# Patient Record
Sex: MALE | Race: WHITE | URBAN - METROPOLITAN AREA
[De-identification: names, ages, dates, MRNs, and addresses within clinical notes are randomized per-mention and may not be internally consistent; named-entity substitution may affect disease eponyms.]

---

## 2023-09-13 ENCOUNTER — PROCEDURE VISIT (OUTPATIENT)
Dept: AUDIOLOGY | Age: 38
End: 2023-09-13

## 2023-09-13 DIAGNOSIS — H90.3 SENSORINEURAL HEARING LOSS, BILATERAL: ICD-10-CM

## 2023-09-13 DIAGNOSIS — H93.13 TINNITUS OF BOTH EARS: Primary | ICD-10-CM

## 2023-09-13 NOTE — PROGRESS NOTES
Yasmany Lopez   1985, 34 y.o. male   [id number]       Referral Type:  Self    Reason for Visit: Evaluation of the cause of disorders of hearing, tinnitus, or balance. ADULT AUDIOLOGIC EVALUATION      Yasmany Lopez is a 34 y.o. male seen today, 9/13/2023 , for an initial audiologic evaluation. AUDIOLOGIC AND OTHER PERTINENT MEDICAL HISTORY:      Yasmany Lopez reports bilateral tinnitus that is mild to moderately intrusive. Tinnitus onset was in his 25s. He states he feels it has worsened in the last couple of years. He had an evaluation at [facility] around the age of 20 without any real plan of action established. Now that it has worsened, he wanted to have another evaluation and determine what his therapy options were. Patient has a history of occupational and/or recreational noise exposure. 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 No other significant otologic history reported. He denied otalgia, aural fullness, otorrhea, dizziness, imbalance, history of falls, history of head trauma, and history of ear surgery. Date: 9/13/2023     IMPRESSIONS:      Today's results revealed a bilateral, mild noise notch. Excellent speech understanding when in quiet. Tympanometry indicates normal middle ear function. Discussed test results and implications with patient. Discussed use of tinnitus management strategies. Provided basic tinnitus education, including the neurophysiological model. Discussed Tinnitus Management Program. Patient is interested. He was given informational packet along with questionnaires. ASSESSMENT AND FINDINGS:     Otoscopy unremarkable. RIGHT EAR:  Hearing Sensitivity: Hearing within normal limits with a mild sensorineural notch at [facility]. Speech Recognition Threshold: 0 dB HL  Word Recognition: Excellent 100%, based on NU-6 25-word list at 45 dBHL using recorded speech stimuli.     Tympanometry: Normal peak pressure and compliance,

## 2023-09-15 PROBLEM — H93.13 TINNITUS OF BOTH EARS: Status: ACTIVE | Noted: 2023-09-15

## 2023-09-27 ENCOUNTER — TELEPHONE (OUTPATIENT)
Dept: ENT CLINIC | Age: 38
End: 2023-09-27

## 2023-09-27 NOTE — TELEPHONE ENCOUNTER
LVM - HAE completed and no benefit.  Wanted to discuss ordering sound generators and schedule dispensing appt weds morning at [facility] 2 weeks out

## 2023-09-27 NOTE — TELEPHONE ENCOUNTER
Spoke to patient and he would like to move forward with sound generators.  He is scheduled for 10/18 at 11 for dispensing in [facility]   He may want the 10/11 at 9 am but needs to check schedule first.

## 2023-10-18 ENCOUNTER — PROCEDURE VISIT (OUTPATIENT)
Dept: AUDIOLOGY | Age: 38
End: 2023-10-18

## 2023-10-18 DIAGNOSIS — H90.3 SENSORINEURAL HEARING LOSS, BILATERAL: ICD-10-CM

## 2023-10-18 DIAGNOSIS — H93.13 TINNITUS OF BOTH EARS: Primary | ICD-10-CM

## 2023-10-19 NOTE — PROGRESS NOTES
30-day right-to-return period. Continue wearing both devices during all waking hours. Retest hearing as medically indicated and/or sooner if a change in hearing is noted. Contact audiologist with questions/concerns as needed  Return in 2 weeks for follow up      Patient paid $2100.     Unbundled Billing- see associated fees and codes below:    Description Code Amount   Hearing Aids  $1090   Dispensing Fee  $300.00   Fitting Fee (Non-Refundable)  $300.00   Earmold Non-Custom  x2 $50.00 x2 = $100.00   1-Year Service Plan  $250.00   Conformity Evaluation   (Real-Ear Measurements)  $60.00         Christiane Momin  Audiologist

## 2023-11-08 ENCOUNTER — PROCEDURE VISIT (OUTPATIENT)
Dept: AUDIOLOGY | Age: 38
End: 2023-11-08

## 2023-11-08 DIAGNOSIS — H93.13 TINNITUS OF BOTH EARS: Primary | ICD-10-CM

## 2023-11-08 DIAGNOSIS — H90.3 SENSORINEURAL HEARING LOSS, BILATERAL: ICD-10-CM

## 2023-11-08 PROCEDURE — NBSRV NON-BILLABLE SERVICE: Performed by: AUDIOLOGIST

## 2023-11-08 NOTE — PROGRESS NOTES
Matthew Collins  [birth year]22 y.o. male   [id number]    TINNITUS MANAGEMENT PROGRAM FOLLOW UP    Right Ear: /Model: [address] R ; SN: [id number]  Left Ear: /Model: Merle Martínez AI 12 [facility] R ; SN: [id number]   SN: [id number]  -----------  Date of Fitting: 10/18/2023  Right to Return Period: 11/17/2023  Service Plan End Date: 10/18/2024  Warranty End Date: 10/18/2026  -----------   Battery: Rechargeable  User Control: Right - Raise; Left - Lower  Phone Connectivity: not at this time     Matthew Collins was seen today, 11/8/2023, for a Tinnitus Management Program follow up. PROCEDURES:     Otoscopy revealed: Clear ear canals bilaterally    Patient noted increased awareness of background/environmental sounds. He states he turns the volume down on his devices while at work, but will increase it again when in more quiet environments. He states he struggles to hear and understand the television at times due to increased background noise. Overall, he is adapting well at for this point of the process. Devices were cleaned and checked. Microphone and  ports were suctioned, domes and wax filters were changed, and devices completed a desiccant cycle through [employer]. Post-cleaning listening check revealed good function of the devices. Reviewed filter maintenance. Connected devices to fitting software. Reduced MPO and loud gain by 4dB, and increased 2-4 kHz soft gain by 2B. PATIENT EDUCATION:     - Verbally and visually reviewed importance of consistent use and care and maintenance of devices. Information was verbally shared with patient during appointment. RECOMMENDATIONS:     Continue consistent sound generator use  Return for device checks as needed  Repeat hearing evaluation if a significant change in hearing and/or tinnitus is noted.   Contact audiologist with questions/concerns as needed  Return in 1 month for follow up    **No charge

## 2023-12-06 ENCOUNTER — PROCEDURE VISIT (OUTPATIENT)
Dept: AUDIOLOGY | Age: 38
End: 2023-12-06

## 2023-12-06 DIAGNOSIS — H93.13 TINNITUS OF BOTH EARS: Primary | ICD-10-CM

## 2023-12-06 DIAGNOSIS — H90.3 SENSORINEURAL HEARING LOSS, BILATERAL: ICD-10-CM

## 2023-12-06 PROCEDURE — NBSRV NON-BILLABLE SERVICE: Performed by: AUDIOLOGIST

## 2023-12-06 NOTE — PROGRESS NOTES
Angeline Lee  [birth year]35 y.o. male   [id number]    TINNITUS MANAGEMENT PROGRAM FOLLOW UP    Right Ear: /Model: Stacy Lima  12 [facility] R ; SN: [id number]  Left Ear: /Model: Stacy GRACE 12 [facility] R ; SN: [id number]   SN: [id number]  -----------  Date of Fitting: 10/18/2023  Right to Return Period: 11/17/2023  Service Plan End Date: 10/18/2024  Warranty End Date: 10/18/2026  -----------   Battery: Rechargeable  User Control: Right - Raise; Left - Lower  Phone Connectivity: not at this time     Angeline Lee was seen today, 12/6/2023, for a Tinnitus Management Program follow up. PROCEDURES:   Updated Questionnaire Scores:  THI - 8  TQQ - 18      Otoscopy revealed: Clear ear canals bilaterally    Patient noted overall satisfaction with his devices. He states he still struggles with background noise occasionally, but does not find it concerning. He reports consistent, everyday use. Devices were cleaned and checked. Microphone and  ports were suctioned, domes and wax filters were changed, and devices completed a desiccant cycle through [employer]. Post-cleaning listening check revealed good function of the devices. PATIENT EDUCATION:     - Verbally and visually reviewed importance of consistent use and care and maintenance of devices. Information was verbally shared with patient during appointment. RECOMMENDATIONS:     Continue consistent sound generator use  Return for device checks as needed  Repeat hearing evaluation if a significant change in hearing and/or tinnitus is noted. Contact audiologist with questions/concerns as needed  Return in 3 months for routine care and maintenance of devices. **No charge for today's appointment; patient under service warranty through 10/2024.          Christiane Wallace  Audiologist

## 2024-03-13 ENCOUNTER — PROCEDURE VISIT (OUTPATIENT)
Dept: AUDIOLOGY | Age: 39
End: 2024-03-13

## 2024-03-13 DIAGNOSIS — H93.13 TINNITUS OF BOTH EARS: Primary | ICD-10-CM

## 2024-03-13 NOTE — PROGRESS NOTES
Aneesh Ellis  1985.39 y.o. male   [id number]    TINNITUS MANAGEMENT PROGRAM FOLLOW UP      Right Ear: /Model: [employer] AI 12 mRIC R ; SN: [id number]  Left Ear: /Model: [employer] AI 12 mRIC R ; SN: [id number]   SN: [id number]  -----------  Date of Fitting: 10/18/2023  Right to Return Period: 11/17/2023  Service Plan End Date: 10/18/2024  Warranty End Date: 10/18/2026  -----------   Battery: Rechargeable  User Control: Right - Raise; Left - Lower  Phone Connectivity: not at this time     Aneesh Ellis was seen today, 3/13/2024, for a Tinnitus Management Program follow up.    PROCEDURES:   Updated Questionnaire Scores:  THI - 10    Otoscopy revealed: Clear ear canals bilaterally    Patient noted gradual improvement in tinnitus overall. He states he is still aware of it in the late evening and when he first wakes in the morning.      Changed to a larger, size 9mm, dome to help with migration out of the ear.    Devices were cleaned and checked. Microphone and  ports were suctioned, domes and wax filters were changed, and devices completed a desiccant cycle through [employer]. Post-cleaning listening check revealed good function of the devices.         PATIENT EDUCATION:     - Verbally and visually reviewed importance of consistent use and care and maintenance of devices.      Information was verbally shared with patient during appointment.        RECOMMENDATIONS:     Continue consistent sound generator use  Return for device checks as needed  Repeat hearing evaluation if a significant change in hearing and/or tinnitus is noted.  Contact audiologist with questions/concerns as needed  Return in 3 months for routine care and maintenance of devices.     **No charge for today's appointment; patient under service warranty through 10/2024.       Christiane Childers  Audiologist

## 2024-06-05 ENCOUNTER — PROCEDURE VISIT (OUTPATIENT)
Dept: AUDIOLOGY | Age: 39
End: 2024-06-05

## 2024-06-05 DIAGNOSIS — H90.3 SENSORINEURAL HEARING LOSS, BILATERAL: ICD-10-CM

## 2024-06-05 DIAGNOSIS — H93.13 TINNITUS OF BOTH EARS: Primary | ICD-10-CM

## 2024-06-05 NOTE — PROGRESS NOTES
Aneesh Ellis  1985.39 y.o. male   [id number]    TINNITUS MANAGEMENT PROGRAM FOLLOW UP    Right Ear: /Model: [employer] AI 12 mRIC R ; SN: [id number]  Left Ear: /Model: [employer] AI 12 mRIC R ; SN: [id number]   SN: [id number]  -----------  Date of Fitting: 10/18/2023  Right to Return Period: 11/17/2023  Service Plan End Date: 10/18/2024  Warranty End Date: 10/18/2026  -----------   Battery: Rechargeable  User Control: Right - Raise; Left - Lower  Phone Connectivity: not at this time     Aneesh Ellis was seen today, 6/5/2024, for a Tinnitus Management Program follow up.    PROCEDURES:   Updated Questionnaire Scores:  THI - 8 vs 10 at previous follow up      Otoscopy revealed: Clear ear canals bilaterally    Patient noted his left device being distorted. Sending device in for in-warranty repair. Will call patient when it returns.        PATIENT EDUCATION:     - Verbally and visually reviewed importance of consistent use and care and maintenance of devices.      Information was verbally shared with patient during appointment.        RECOMMENDATIONS:     Patient to  aid when it returns.      **No charge for today's appointment; patient under service warranty through 10/2024.         Mary Lou Emmanuel, Christiane  Audiologist

## 2024-07-12 ENCOUNTER — TELEPHONE (OUTPATIENT)
Dept: ENT CLINIC | Age: 39
End: 2024-07-12

## 2024-07-12 NOTE — TELEPHONE ENCOUNTER
LVM for patient to call back - repair is in and needs to schedule appt. Please send to me when he calls back. My ext is [phone]

## 2024-09-18 ENCOUNTER — PROCEDURE VISIT (OUTPATIENT)
Dept: AUDIOLOGY | Age: 39
End: 2024-09-18

## 2024-09-18 DIAGNOSIS — H93.13 TINNITUS OF BOTH EARS: Primary | ICD-10-CM

## 2024-09-18 DIAGNOSIS — H90.3 SENSORINEURAL HEARING LOSS, BILATERAL: ICD-10-CM
